# Patient Record
Sex: FEMALE
[De-identification: names, ages, dates, MRNs, and addresses within clinical notes are randomized per-mention and may not be internally consistent; named-entity substitution may affect disease eponyms.]

---

## 2019-07-29 ENCOUNTER — HOSPITAL ENCOUNTER (INPATIENT)
Dept: HOSPITAL 88 - ER | Age: 54
LOS: 2 days | Discharge: HOME | DRG: 445 | End: 2019-07-31
Attending: INTERNAL MEDICINE | Admitting: INTERNAL MEDICINE
Payer: COMMERCIAL

## 2019-07-29 VITALS — SYSTOLIC BLOOD PRESSURE: 116 MMHG | DIASTOLIC BLOOD PRESSURE: 61 MMHG

## 2019-07-29 VITALS — WEIGHT: 215 LBS | HEIGHT: 66 IN | BODY MASS INDEX: 34.55 KG/M2

## 2019-07-29 DIAGNOSIS — Z98.84: ICD-10-CM

## 2019-07-29 DIAGNOSIS — K82.8: Primary | ICD-10-CM

## 2019-07-29 DIAGNOSIS — K56.609: ICD-10-CM

## 2019-07-29 DIAGNOSIS — K80.10: ICD-10-CM

## 2019-07-29 LAB
ALBUMIN SERPL-MCNC: 4 G/DL (ref 3.5–5)
ALBUMIN/GLOB SERPL: 1.3 {RATIO} (ref 0.8–2)
ALP SERPL-CCNC: 94 IU/L (ref 40–150)
ALT SERPL-CCNC: 20 IU/L (ref 0–55)
AMYLASE SERPL-CCNC: 30 U/L (ref 25–125)
ANION GAP SERPL CALC-SCNC: 17.5 MMOL/L (ref 8–16)
BACTERIA URNS QL MICRO: (no result) /HPF
BASOPHILS # BLD AUTO: 0 10*3/UL (ref 0–0.1)
BASOPHILS NFR BLD AUTO: 0.5 % (ref 0–1)
BILIRUB UR QL: NEGATIVE
BUN SERPL-MCNC: 8 MG/DL (ref 7–26)
BUN/CREAT SERPL: 11 (ref 6–25)
CALCIUM SERPL-MCNC: 9.8 MG/DL (ref 8.4–10.2)
CHLORIDE SERPL-SCNC: 104 MMOL/L (ref 98–107)
CK MB SERPL-MCNC: 1.1 NG/ML (ref 0–5)
CK SERPL-CCNC: 99 IU/L (ref 29–168)
CLARITY UR: CLEAR
CO2 SERPL-SCNC: 22 MMOL/L (ref 22–29)
COARSE GRAN CASTS URNS QL MICRO: (no result)
COLOR UR: YELLOW
DEPRECATED NEUTROPHILS # BLD AUTO: 3.4 10*3/UL (ref 2.1–6.9)
DEPRECATED RBC URNS MANUAL-ACNC: (no result) /HPF (ref 0–5)
EGFRCR SERPLBLD CKD-EPI 2021: > 60 ML/MIN (ref 60–?)
EOSINOPHIL # BLD AUTO: 0 10*3/UL (ref 0–0.4)
EOSINOPHIL NFR BLD AUTO: 0.4 % (ref 0–6)
EPI CELLS URNS QL MICRO: (no result) /LPF
ERYTHROCYTE [DISTWIDTH] IN CORD BLOOD: 12.6 % (ref 11.7–14.4)
GLOBULIN PLAS-MCNC: 3 G/DL (ref 2.3–3.5)
GLUCOSE SERPLBLD-MCNC: 137 MG/DL (ref 74–118)
HCT VFR BLD AUTO: 36.8 % (ref 34.2–44.1)
HGB BLD-MCNC: 12.8 G/DL (ref 12–16)
KETONES UR QL STRIP.AUTO: (no result)
LEUKOCYTE ESTERASE UR QL STRIP.AUTO: NEGATIVE
LIPASE SERPL-CCNC: 17 U/L (ref 8–78)
LYMPHOCYTES # BLD: 1.7 10*3/UL (ref 1–3.2)
LYMPHOCYTES NFR BLD AUTO: 30.4 % (ref 18–39.1)
MCH RBC QN AUTO: 31.4 PG (ref 28–32)
MCHC RBC AUTO-ENTMCNC: 34.8 G/DL (ref 31–35)
MCV RBC AUTO: 90.4 FL (ref 81–99)
MONOCYTES # BLD AUTO: 0.4 10*3/UL (ref 0.2–0.8)
MONOCYTES NFR BLD AUTO: 7.5 % (ref 4.4–11.3)
NEUTS SEG NFR BLD AUTO: 60.8 % (ref 38.7–80)
NITRITE UR QL STRIP.AUTO: NEGATIVE
PLATELET # BLD AUTO: 193 X10E3/UL (ref 140–360)
POTASSIUM SERPL-SCNC: 3.5 MMOL/L (ref 3.5–5.1)
PROT UR QL STRIP.AUTO: NEGATIVE
RBC # BLD AUTO: 4.07 X10E6/UL (ref 3.6–5.1)
SODIUM SERPL-SCNC: 140 MMOL/L (ref 136–145)
SP GR UR STRIP: 1.02 (ref 1.01–1.02)
UROBILINOGEN UR STRIP-MCNC: 0.2 MG/DL (ref 0.2–1)
WBC #/AREA URNS HPF: (no result) /HPF (ref 0–5)

## 2019-07-29 PROCEDURE — 82150 ASSAY OF AMYLASE: CPT

## 2019-07-29 PROCEDURE — 99284 EMERGENCY DEPT VISIT MOD MDM: CPT

## 2019-07-29 PROCEDURE — 78227 HEPATOBIL SYST IMAGE W/DRUG: CPT

## 2019-07-29 PROCEDURE — 81001 URINALYSIS AUTO W/SCOPE: CPT

## 2019-07-29 PROCEDURE — 80048 BASIC METABOLIC PNL TOTAL CA: CPT

## 2019-07-29 PROCEDURE — 93005 ELECTROCARDIOGRAM TRACING: CPT

## 2019-07-29 PROCEDURE — 85025 COMPLETE CBC W/AUTO DIFF WBC: CPT

## 2019-07-29 PROCEDURE — 74019 RADEX ABDOMEN 2 VIEWS: CPT

## 2019-07-29 PROCEDURE — 84702 CHORIONIC GONADOTROPIN TEST: CPT

## 2019-07-29 PROCEDURE — 74177 CT ABD & PELVIS W/CONTRAST: CPT

## 2019-07-29 PROCEDURE — 87086 URINE CULTURE/COLONY COUNT: CPT

## 2019-07-29 PROCEDURE — 82553 CREATINE MB FRACTION: CPT

## 2019-07-29 PROCEDURE — 80053 COMPREHEN METABOLIC PANEL: CPT

## 2019-07-29 PROCEDURE — 84484 ASSAY OF TROPONIN QUANT: CPT

## 2019-07-29 PROCEDURE — 82550 ASSAY OF CK (CPK): CPT

## 2019-07-29 PROCEDURE — 71045 X-RAY EXAM CHEST 1 VIEW: CPT

## 2019-07-29 PROCEDURE — 74022 RADEX COMPL AQT ABD SERIES: CPT

## 2019-07-29 PROCEDURE — 83690 ASSAY OF LIPASE: CPT

## 2019-07-29 PROCEDURE — 76705 ECHO EXAM OF ABDOMEN: CPT

## 2019-07-29 PROCEDURE — 36415 COLL VENOUS BLD VENIPUNCTURE: CPT

## 2019-07-29 RX ADMIN — HYDROMORPHONE HYDROCHLORIDE PRN MG: 2 INJECTION INTRAMUSCULAR; INTRAVENOUS; SUBCUTANEOUS at 21:54

## 2019-07-29 RX ADMIN — SODIUM CHLORIDE SCH MLS/HR: 9 INJECTION, SOLUTION INTRAVENOUS at 21:35

## 2019-07-29 RX ADMIN — PHENOBARBITAL ELIXIR SCH ML: 16.2; .1037; .0065; .0194 ELIXIR ORAL at 17:51

## 2019-07-29 NOTE — DIAGNOSTIC IMAGING REPORT
EXAM: Right upper quadrant abdominal ultrasound



INDICATION:   Right upper quadrant pain 

COMPARISON: None. 

TECHNIQUE: Transverse and longitudinal images of the right upper quadrant

abdomen were obtained



FINDINGS:     

Liver:

Size: 12.6 cm in the right midclavicular line, normal

Appearance: Normal echogenicity, smooth contour

Mass: No focal masses



Gallbladder: Multiple echogenic gallstones with posterior shadowing. No

gallbladder wall thickening or pericholecystic fluid. Negative sonographic

Garcia's sign. Gallbladder wall measures 0.3 cm. 



Bile Ducts:

Intrahepatic Ducts: No dilatation

Extrahepatic Ducts: Common bile duct measures 0.2cm, no dilatation



Pancreas:

Limited visualization to to overlying bowel gas.



Kidney: The right kidney measures 10.0 cm without evidence of hydronephrosis or

stone. 



Vessels:

Aorta: Limited visualization due to overlying bowel gas.

Inferior Vena Cava: Limited visualization due to overlying bowel gas.

Main Portal Vein: 0.7 cm, normal size with hepatopetal flow.



Free Fluid:

No ascites or pleural effusion



IMPRESSION:

Cholelithiasis without sonographic evidence of cholecystitis.



Signed by: Brandan Davis MD on 7/29/2019 3:19 PM

## 2019-07-29 NOTE — DIAGNOSTIC IMAGING REPORT
EXAM:  CT of the abdomen and pelvis WITHOUT contrast



HISTORY:  r/o sbo / divertic / pancreatitis c/o ruq pain, history of gastric

bypass

COMPARISON:  None available..



TECHNIQUE: 

The abdomen and pelvis were scanned utilizing a multidetector helical scanner. 



Coronal and sagittal reformats are available.  

            PROTOCOL:  Routine

            IV CONTRAST:  100 cc of Isovue-370.

            ORAL CONTRAST:  None, which limits sensitivity and specificity of

the exam.

            RADIATION DOSE: Total DLP: 841.84 mGy*cm

                      Estimated effective dose:  (DLP x 0.015 x size factor) 

Dose modulation, iterative reconstruction, and/or weight based adjustment of

the mA/kV was utilized to reduce the radiation dose to as low as reasonably

achievable.

            COMPLICATIONS: None



FINDINGS:

LOWER THORAX: 

Incidentally, a subcentimeter thin-walled peripheral cyst at the left lung

base.



HEPATOBILIARY:  

No mass.

No biliary dilation.

No calcified gallstone.

SPLEEN: No splenomegaly.     

PANCREAS: No focal masses or ductal dilatation.     

ADRENALS:  No discrete adrenal nodule.

KIDNEYS/URETERS: 

No hydronephrosis, stones, or definite solid mass lesions.



PELVIC ORGANS/BLADDER: 

Bilateral Essure devices.



GI TRACT: 

Postsurgical changes in the region of the proximal stomach and left upper

quadrant of the abdomen.

Low density fluid within the partially decompressed stomach.

Within the left upper quadrant of the abdomen, dilated and fluid-filled small

bowel (up to 4.4 cm in diameter), short segment narrowing to decompress small

bowel in the region of some of the radiopaque suture material (axial images

32-34).



PERITONEUM / RETROPERITONEUM: Small volume low density free fluid within the

pelvis. No free air.

LYMPH NODES: No pathologically enlarged lymph node.

VESSELS: Unremarkable.

BONES:  No aggressive osseous lesion or acute fracture.

SOFT TISSUES: Otherwise, unremarkable.



IMPRESSION: 

Findings compatible with a high-grade small bowel obstruction in the region of

the postsurgical changes within the left upper quadrant of the abdomen, as

detailed above.



Signed by: Dr. Param Wu D.O., M.M.M. on 7/29/2019 5:49 PM

## 2019-07-29 NOTE — DIAGNOSTIC IMAGING REPORT
EXAMINATION:  CHEST SINGLE (PORTABLE)    



INDICATION: Pain



COMPARISON: None

     

FINDINGS:



LINES/TUBES:EKG leads overlie the chest.



LUNGS:The lungs are well-inflated. No focal consolidation or pulmonary edema.



PLEURA:No pleural effusion or pneumothorax.



MEDIASTINUM:The cardiomediastinal silhouette appears normal in size and shape.



BONES/SOFT TISSUES:No acute osseous injury.



ABDOMEN:No free air under the diaphragm.





IMPRESSION: 

No focal pneumonia or pulmonary edema.



Signed by: Brandan Davis MD on 7/29/2019 4:22 PM

## 2019-07-29 NOTE — XMS REPORT
Patient Summary Document

                             Created on: 2019



PAMELLA BEAR

External Reference #: [id number]

: 1965

Sex: Female



Demographics







                          Address                   [address] [place] [address]

 

                          Home Phone                [phone]

 

                          Preferred Language        Unknown

 

                          Marital Status            Unknown

 

                          Restorationism Affiliation     Unknown

 

                          Race                      Unknown

 

                                        Additional Race(s)  

 

                          Ethnic Group              Unknown





Author







                          Author                    [facility]

 

                          Address                   Unknown

 

                          Phone                     Unavailable







Care Team Providers







                    Care Team Member Name    Role                Phone

 

                    DARRELL ELLIOTT       Unavailable         Unavailable







Problems

This patient has no known problems.



Allergies, Adverse Reactions, Alerts

This patient has no known allergies or adverse reactions.



Medications

This patient has no known medications.



Results







           Test Description    Test Time    Test Comments    Text Results    Atomic Results    Result

 Comments

 

                CT ABDOMEN/PELVIS W    2019 17:34:00                                                       

                                                   [place] [address]      Patient Name: PAMELLA BEAR                                   MR
#: [id number]                     : 1965                               
   Age/Sex: 53/F  Acct #: [id number]                              Req #: [id number]
[id number]  Adm Physician:                                                      
Ordered by: BEATRIZ RIDDLE NP                            Report #: [id number]  
     Location: ER                                      Room/Bed:                
    
___________________________________________________________________________________________________
   Procedure: [id number] CT/CT ABDOMEN/PELVIS W  Exam Date: 19             
              Exam Time: 1615                                              
REPORT STATUS: Signed    EXAM:  CT of the abdomen and pelvis WITHOUT contrast   
  HISTORY:  r/o sbo / divertic / pancreatitis c/o ruq pain, history of gastric  
bypass   COMPARISON:  None available..      TECHNIQUE:    The abdomen and pelvis
were scanned utilizing a multidetector helical scanner.       Coronal and 
sagittal reformats are available.                 PROTOCOL:  Routine            
  IV CONTRAST:  100 cc of Isovue-370.               ORAL CONTRAST:  None, which 
limits sensitivity and specificity of   the exam.               RADIATION DOSE: 
Total DLP: 841.84 mGy*cm                         Estimated effective dose:  (DLP
x 0.015 x size factor)    Dose modulation, iterative reconstruction, and/or 
weight based adjustment of   the mA/kV was utilized to reduce the radiation dose
to as low as reasonably   achievable.               COMPLICATIONS: None      
FINDINGS:   LOWER THORAX:    Incidentally, a subcentimeter thin-walled 
peripheral cyst at the left lung   base.      HEPATOBILIARY:     No mass.   No 
biliary dilation.   No calcified gallstone.   SPLEEN: No splenomegaly.        
PANCREAS: No focal masses or ductal dilatation.        ADRENALS:  No discrete 
adrenal nodule.   KIDNEYS/URETERS:    No hydronephrosis, stones, or definite 
solid mass lesions.      PELVIC ORGANS/BLADDER:    Bilateral Essure devices.    
 GI TRACT:    Postsurgical changes in the region of the proximal stomach and 
left upper   quadrant of the abdomen.   Low density fluid within the partially d
ecompressed stomach.   Within the left upper quadrant of the abdomen, dilated 
and fluid-filled small   bowel (up to 4.4 cm in diameter), short segment 
narrowing to decompress small   bowel in the region of some of the radiopaque 
suture material (axial images   32-34).      PERITONEUM / RETROPERITONEUM: Small
volume low density free fluid within the   pelvis. No free air.   LYMPH NODES: 
No pathologically enlarged lymph node.   VESSELS: Unremarkable.   BONES:  No 
aggressive osseous lesion or acute fracture.   SOFT TISSUES: Otherwise, 
unremarkable.      IMPRESSION:    Findings compatible with a high-grade small 
bowel obstruction in the region of   the postsurgical changes within the left 
upper quadrant of the abdomen, as   detailed above.      Signed by: Dr. Elma Wu D.O., M.M.M. on 2019 5:49 PM        Dictated By: ELMA WU DO  
Electronically Signed By: ELMA WU DO on 19  Transcribed By: 
SILVANA on 19       COPY TO:   BEATRIZ RIDDLE NP              

 

                CHEST SINGLE (PORTABLE)    2019 16:19:00                                                   

                                                       [place] [address]      Patient Name: PAMELLA BEAR                           
       MR #: [id number]                     : 1965                     
             Age/Sex: 53/F  Acct #: [id number]                              
Req #: [id number]  Adm Physician:                                               
      Ordered by: SHORT, BEATRIZ D NP                            Report #: [id number]
[id number]        Location: ER                                      Room/Bed:         
           
___________________________________________________________________________________________________
   Procedure: [id number] DX/CHEST SINGLE (PORTABLE)  Exam Date: 19         
                  Exam Time: 1601                                              
REPORT STATUS: Signed    EXAMINATION:  CHEST SINGLE (PORTABLE)          INDICA
TION: Pain      COMPARISON: None           FINDINGS:      LINES/TUBES:EKG leads 
overlie the chest.      LUNGS:The lungs are well-inflated. No focal 
consolidation or pulmonary edema.      PLEURA:No pleural effusion or 
pneumothorax.      MEDIASTINUM:The cardiomediastinal silhouette appears normal 
in size and shape.      BONES/SOFT TISSUES:No acute osseous injury.      
ABDOMEN:No free air under the diaphragm.         IMPRESSION:    No focal 
pneumonia or pulmonary edema.      Signed by: Richard Parekh MD on 2019 4:22 
PM        Dictated By: RICHARD PAREKH MD  Electronically Signed By: RICHARD PAREKH MD on
19  Transcribed By: SILVANA on 19       COPY TO:   
BEATRIZ RIDDLE NP                       

 

                US GALLBLADDER    2019 15:17:00                                                            

                                              [place] [address] 
    Patient Name: PAMELLA BEAR                                   MR #: 
[id number]                     : 1965                                  
Age/Sex: 53/F  Acct #: [id number]                              Req #: [id number]
[id number]  Adm Physician:                                                      
Ordered by: BEATRIZ RIDDLE NP                            Report #: [id number]  
     Location: ER                                      Room/Bed:                
    
___________________________________________________________________________________________________
   Procedure: [id number] US/US GALLBLADDER  Exam Date: 19                  
         Exam Time: 1445                                              REPORT 
STATUS: Signed    EXAM: Right upper quadrant abdominal ultrasound      INDICATIO
N:   Right upper quadrant pain    COMPARISON: None.    TECHNIQUE: Transverse and
longitudinal images of the right upper quadrant   abdomen were obtained      
FINDINGS:        Liver:   Size: 12.6 cm in the right midclavicular line, normal 
 Appearance: Normal echogenicity, smooth contour   Mass: No focal masses      
Gallbladder: Multiple echogenic gallstones with posterior shadowing. No   
gallbladder wall thickening or pericholecystic fluid. Negative sonographic   
Garcia's sign. Gallbladder wall measures 0.3 cm.       Bile Ducts:   
Intrahepatic Ducts: No dilatation   Extrahepatic Ducts: Common bile duct 
measures 0.2cm, no dilatation      Pancreas:   Limited visualization to to 
overlying bowel gas.      Kidney: The right kidney measures 10.0 cm without 
evidence of hydronephrosis or   stone.       Vessels:   Aorta: Limited 
visualization due to overlying bowel gas.   Inferior Vena Cava: Limited 
visualization due to overlying bowel gas.   Main Portal Vein: 0.7 cm, normal 
size with hepatopetal flow.      Free Fluid:   No ascites or pleural effusion   
  IMPRESSION:   Cholelithiasis without sonographic evidence of cholecystitis.   
  Signed by: Richard Parekh MD on 2019 3:19 PM        Dictated By: RICHARD PAREKH MD  Electronically Signed By: RICHARD PAREKH MD on 19 5700  Transcribed By: 
SILVANA on 19 1687       COPY TO:   BEATRIZ RIDDLE NP

## 2019-07-30 VITALS — DIASTOLIC BLOOD PRESSURE: 62 MMHG | SYSTOLIC BLOOD PRESSURE: 118 MMHG

## 2019-07-30 VITALS — DIASTOLIC BLOOD PRESSURE: 76 MMHG | SYSTOLIC BLOOD PRESSURE: 127 MMHG

## 2019-07-30 VITALS — DIASTOLIC BLOOD PRESSURE: 61 MMHG | SYSTOLIC BLOOD PRESSURE: 118 MMHG

## 2019-07-30 VITALS — SYSTOLIC BLOOD PRESSURE: 119 MMHG | DIASTOLIC BLOOD PRESSURE: 69 MMHG

## 2019-07-30 VITALS — SYSTOLIC BLOOD PRESSURE: 118 MMHG | DIASTOLIC BLOOD PRESSURE: 62 MMHG

## 2019-07-30 VITALS — SYSTOLIC BLOOD PRESSURE: 120 MMHG | DIASTOLIC BLOOD PRESSURE: 67 MMHG

## 2019-07-30 LAB
ANION GAP SERPL CALC-SCNC: 11.5 MMOL/L (ref 8–16)
BASOPHILS # BLD AUTO: 0 10*3/UL (ref 0–0.1)
BASOPHILS NFR BLD AUTO: 0.6 % (ref 0–1)
BUN SERPL-MCNC: 9 MG/DL (ref 7–26)
BUN/CREAT SERPL: 13 (ref 6–25)
CALCIUM SERPL-MCNC: 8.8 MG/DL (ref 8.4–10.2)
CHLORIDE SERPL-SCNC: 108 MMOL/L (ref 98–107)
CO2 SERPL-SCNC: 25 MMOL/L (ref 22–29)
DEPRECATED NEUTROPHILS # BLD AUTO: 3.4 10*3/UL (ref 2.1–6.9)
EGFRCR SERPLBLD CKD-EPI 2021: > 60 ML/MIN (ref 60–?)
EOSINOPHIL # BLD AUTO: 0 10*3/UL (ref 0–0.4)
EOSINOPHIL NFR BLD AUTO: 0.2 % (ref 0–6)
ERYTHROCYTE [DISTWIDTH] IN CORD BLOOD: 12.7 % (ref 11.7–14.4)
GLUCOSE SERPLBLD-MCNC: 120 MG/DL (ref 74–118)
HCT VFR BLD AUTO: 34 % (ref 34.2–44.1)
HGB BLD-MCNC: 11.6 G/DL (ref 12–16)
LYMPHOCYTES # BLD: 1.5 10*3/UL (ref 1–3.2)
LYMPHOCYTES NFR BLD AUTO: 27.1 % (ref 18–39.1)
MCH RBC QN AUTO: 31.6 PG (ref 28–32)
MCHC RBC AUTO-ENTMCNC: 34.1 G/DL (ref 31–35)
MCV RBC AUTO: 92.6 FL (ref 81–99)
MONOCYTES # BLD AUTO: 0.5 10*3/UL (ref 0.2–0.8)
MONOCYTES NFR BLD AUTO: 9.2 % (ref 4.4–11.3)
NEUTS SEG NFR BLD AUTO: 62.5 % (ref 38.7–80)
PLATELET # BLD AUTO: 163 X10E3/UL (ref 140–360)
POTASSIUM SERPL-SCNC: 3.5 MMOL/L (ref 3.5–5.1)
RBC # BLD AUTO: 3.67 X10E6/UL (ref 3.6–5.1)
SODIUM SERPL-SCNC: 141 MMOL/L (ref 136–145)

## 2019-07-30 RX ADMIN — HYDROMORPHONE HYDROCHLORIDE PRN MG: 2 INJECTION INTRAMUSCULAR; INTRAVENOUS; SUBCUTANEOUS at 04:24

## 2019-07-30 RX ADMIN — PHENOBARBITAL ELIXIR SCH ML: 16.2; .1037; .0065; .0194 ELIXIR ORAL at 15:00

## 2019-07-30 RX ADMIN — FAMOTIDINE SCH MG: 10 INJECTION, SOLUTION INTRAVENOUS at 09:06

## 2019-07-30 RX ADMIN — PROMETHAZINE HYDROCHLORIDE PRN MG: 25 INJECTION, SOLUTION INTRAMUSCULAR; INTRAVENOUS at 04:24

## 2019-07-30 RX ADMIN — PROMETHAZINE HYDROCHLORIDE PRN MG: 25 INJECTION, SOLUTION INTRAMUSCULAR; INTRAVENOUS at 18:27

## 2019-07-30 RX ADMIN — PROMETHAZINE HYDROCHLORIDE PRN MG: 25 INJECTION, SOLUTION INTRAMUSCULAR; INTRAVENOUS at 09:06

## 2019-07-30 RX ADMIN — HYDROMORPHONE HYDROCHLORIDE PRN MG: 2 INJECTION INTRAMUSCULAR; INTRAVENOUS; SUBCUTANEOUS at 09:06

## 2019-07-30 RX ADMIN — PHENOBARBITAL ELIXIR SCH ML: 16.2; .1037; .0065; .0194 ELIXIR ORAL at 10:04

## 2019-07-30 RX ADMIN — SODIUM CHLORIDE SCH MLS/HR: 9 INJECTION, SOLUTION INTRAVENOUS at 14:00

## 2019-07-30 RX ADMIN — FAMOTIDINE SCH MG: 10 INJECTION, SOLUTION INTRAVENOUS at 18:27

## 2019-07-30 RX ADMIN — SODIUM CHLORIDE SCH MLS/HR: 9 INJECTION, SOLUTION INTRAVENOUS at 04:30

## 2019-07-30 RX ADMIN — PHENOBARBITAL ELIXIR SCH ML: 16.2; .1037; .0065; .0194 ELIXIR ORAL at 21:00

## 2019-07-30 NOTE — CONSULTATION
DATE OF CONSULTATION:  07/30/2019  

 

HISTORY OF PRESENT ILLNESS:  The patient is a 53-year-old female, previous gastric

bypass done in 2008, presents with complaints of epigastric abdominal pain started about

four days ago, was milder, then got worse and became very severe yesterday, for which

she came to the emergency room.  She had associated nausea and vomiting.  She says the

pain is less now.  Evaluation with CT scan of the abdomen and pelvis as well as

gallbladder ultrasound revealed findings suggestive of possible intestinal obstruction.

All this was strictly involving the Calos-Y limb and also she was found to have

gallstones. 

 

PAST MEDICAL HISTORY:  Significant for previous gastric bypass as stated above, previous

jaw surgery.  She denies any other medical problems. 

 

ALLERGIES:  SHE HAS NO KNOWN ALLERGIES.

 

MEDICATIONS:  There were no current medications except for vitamins.

 

FAMILY HISTORY:  Noncontributory.

 

SOCIAL HISTORY:  The patient does not smoke cigarettes or drink alcohol.

 

REVIEW OF SYSTEMS:

As stated above, otherwise was negative.

 

PHYSICAL EXAMINATION:

GENERAL:  The patient is awake, alert, in no distress. 

VITAL SIGNS:  Normal. 

HEENT:  Unremarkable.  Sclerae are not icteric. 

NECK:  Supple.  No masses. 

LUNGS:  Equal breath sounds are clear bilaterally. 

CARDIAC:  Regular rate and rhythm with no murmur. 

ABDOMEN:  Soft.  There was no significant tenderness.  No mass.  No distention.  No

organomegaly. 

EXTREMITIES:  Have no edema.  Pulses are palpable. 

NEUROLOGIC:  Intact.

ASSESSMENT:  A 53-year-old female with abdominal pain.  CT scan suggests small bowel

obstruction, but she also has gallstones.  Her symptoms are more consistent with

possible gallbladder disease and biliary colic rather than intestinal obstruction as she

is passing flatus and findings on CT scan have dilated Calos Y limb is physiologic after

gastric bypass. 

 

PLAN:  To evaluate further with a HIDA scan.  There are no signs of peritonitis.  No

findings that warrant immediate surgical intervention. 

 

Thank you for asking me to see Ms. Nails.

 

 

 

 

______________________________

MD ALEX Blackwood/GRACIELA

D:  07/30/2019 08:13:13

T:  07/30/2019 12:42:04

Job #:  [id number]

## 2019-07-30 NOTE — NUR
PATIENT IS MOSTLY ASLEEP, SHE'S EASY TO AROUSE AND SHE DENIES ABDOMINAL PAIN. BED ALARM ON, 
CALL LIGHT WITHIN EASY REACH. CALL AND SPOKE WITH DR MORRISON REGARDING THE PRELIMINARY HIDA 
SCAN RESULT WITH EF OF 7%, MD STATED "I WILL SEE HER TOMORROW."

## 2019-07-30 NOTE — DIAGNOSTIC IMAGING REPORT
Exam: KUB - 2 views



Clinical History: Small bowel obstruction



Comparison: CT abdomen pelvis of 7/29/2019



Findings: 

There are distended loops of small bowel in the left abdomen measuring up to at

least 3.5 cm with air-fluid levels consistent with known small bowel

obstruction seen on the CT abdomen and pelvis of 7/29/2019. No free air.

Surgical clips and suture project over the left upper quadrant. The partially

visualized lung bases appear unremarkable. The osseous structures appear

unremarkable. There is contrast material in the bladder. Incidental note of

tubal ligation devices. No abnormal calcifications.



Impression:

Redemonstration of findings of small bowel obstruction. No free air.



Signed by: Brandan Davis MD on 7/30/2019 9:56 AM

## 2019-07-30 NOTE — DIAGNOSTIC IMAGING REPORT
Hepatobiliary Scan with Gallbladder Ejection Fraction



Clinical information: 53 F with small bowel obstruction.  Severe abdominal pain





Technique: Following intravenous administration of 6.8 millicuries of Tc-99m

mebrofenin, dynamic images of the abdomen in the anterior projection were

obtained through 30 minutes.  Additional static image was obtained at 1 hour. 

Sincalide (CCK analog) 2.0 micrograms was administered intravenously over 30

minutes with additional imaging for determination of gallbladder ejection

fraction.



Discussion: Perfusion of the liver is normal.  Extraction of tracer by the

liver parenchyma is normal.  Tracer appears promptly within the biliary tract. 

The gallbladder begins to fill  by 60 minutes post injection of tracer and

fills adequately.  Tracer is seen in the small bowel by 21 minutes.  The

gallbladder ejection fraction with sincalide is 7% (normal greater than 40%).



Impression: 



1.  Filling of the gallbladder excludes acute cystic duct obstruction/acute

cholecystitis.



2.  The decreased gallbladder ejection fraction of 7% supports the clinical

diagnosis of chronic cholecystitis/gallbladder dyskinesia.



Signed by: Dr. Rosana Christie M.D. on 7/30/2019 7:51 PM

## 2019-07-30 NOTE — NUR
Report given to oncoming nurse of patient's status. Resting in bed. No s/s of acute distress 
noted.

## 2019-07-30 NOTE — NUR
The patient was feeling nausea. RN provided the ememis bag while administering Phenergan and 
pain medication. The patient stated she is feeling better.

## 2019-07-31 VITALS — SYSTOLIC BLOOD PRESSURE: 121 MMHG | DIASTOLIC BLOOD PRESSURE: 72 MMHG

## 2019-07-31 VITALS — SYSTOLIC BLOOD PRESSURE: 107 MMHG | DIASTOLIC BLOOD PRESSURE: 66 MMHG

## 2019-07-31 VITALS — DIASTOLIC BLOOD PRESSURE: 72 MMHG | SYSTOLIC BLOOD PRESSURE: 121 MMHG

## 2019-07-31 VITALS — SYSTOLIC BLOOD PRESSURE: 110 MMHG | DIASTOLIC BLOOD PRESSURE: 64 MMHG

## 2019-07-31 VITALS — SYSTOLIC BLOOD PRESSURE: 163 MMHG | DIASTOLIC BLOOD PRESSURE: 59 MMHG

## 2019-07-31 VITALS — SYSTOLIC BLOOD PRESSURE: 119 MMHG | DIASTOLIC BLOOD PRESSURE: 67 MMHG

## 2019-07-31 LAB
ANION GAP SERPL CALC-SCNC: 12.8 MMOL/L (ref 8–16)
BASOPHILS # BLD AUTO: 0 10*3/UL (ref 0–0.1)
BASOPHILS NFR BLD AUTO: 0.7 % (ref 0–1)
BUN SERPL-MCNC: 6 MG/DL (ref 7–26)
BUN/CREAT SERPL: 9 (ref 6–25)
CALCIUM SERPL-MCNC: 8.9 MG/DL (ref 8.4–10.2)
CHLORIDE SERPL-SCNC: 104 MMOL/L (ref 98–107)
CO2 SERPL-SCNC: 26 MMOL/L (ref 22–29)
DEPRECATED NEUTROPHILS # BLD AUTO: 2.1 10*3/UL (ref 2.1–6.9)
EGFRCR SERPLBLD CKD-EPI 2021: > 60 ML/MIN (ref 60–?)
EOSINOPHIL # BLD AUTO: 0.1 10*3/UL (ref 0–0.4)
EOSINOPHIL NFR BLD AUTO: 2.2 % (ref 0–6)
ERYTHROCYTE [DISTWIDTH] IN CORD BLOOD: 12.5 % (ref 11.7–14.4)
GLUCOSE SERPLBLD-MCNC: 96 MG/DL (ref 74–118)
HCT VFR BLD AUTO: 34.7 % (ref 34.2–44.1)
HGB BLD-MCNC: 11.9 G/DL (ref 12–16)
LYMPHOCYTES # BLD: 1.9 10*3/UL (ref 1–3.2)
LYMPHOCYTES NFR BLD AUTO: 41.2 % (ref 18–39.1)
MCH RBC QN AUTO: 31.4 PG (ref 28–32)
MCHC RBC AUTO-ENTMCNC: 34.3 G/DL (ref 31–35)
MCV RBC AUTO: 91.6 FL (ref 81–99)
MONOCYTES # BLD AUTO: 0.4 10*3/UL (ref 0.2–0.8)
MONOCYTES NFR BLD AUTO: 9 % (ref 4.4–11.3)
NEUTS SEG NFR BLD AUTO: 46.7 % (ref 38.7–80)
PLATELET # BLD AUTO: 149 X10E3/UL (ref 140–360)
POTASSIUM SERPL-SCNC: 3.8 MMOL/L (ref 3.5–5.1)
RBC # BLD AUTO: 3.79 X10E6/UL (ref 3.6–5.1)
SODIUM SERPL-SCNC: 139 MMOL/L (ref 136–145)

## 2019-07-31 RX ADMIN — SODIUM CHLORIDE SCH MLS/HR: 9 INJECTION, SOLUTION INTRAVENOUS at 08:14

## 2019-07-31 RX ADMIN — PHENOBARBITAL ELIXIR SCH ML: 16.2; .1037; .0065; .0194 ELIXIR ORAL at 08:33

## 2019-07-31 RX ADMIN — PHENOBARBITAL ELIXIR SCH ML: 16.2; .1037; .0065; .0194 ELIXIR ORAL at 17:26

## 2019-07-31 RX ADMIN — SODIUM CHLORIDE SCH MLS/HR: 9 INJECTION, SOLUTION INTRAVENOUS at 00:15

## 2019-07-31 RX ADMIN — SODIUM CHLORIDE SCH MLS/HR: 9 INJECTION, SOLUTION INTRAVENOUS at 11:00

## 2019-07-31 RX ADMIN — FAMOTIDINE SCH MG: 10 INJECTION, SOLUTION INTRAVENOUS at 17:26

## 2019-07-31 RX ADMIN — FAMOTIDINE SCH MG: 10 INJECTION, SOLUTION INTRAVENOUS at 08:33

## 2019-07-31 NOTE — NUR
PER DISCUSSION DURING ROUNDS PATIENT TO GET TESTING AND RESULTS CALLED TO DR. VILCHIS. IF 
RESULTS WNL AND PATIENT CLEARED BY DR. MORRISON THEN PATIENT TO DC TODAY.

## 2019-07-31 NOTE — DIAGNOSTIC IMAGING REPORT
EXAMINATION:  ABDOMEN ACUTE SERIES W/PA CXR    



INDICATION: Pain, known small bowel obstruction.



COMPARISON: KUB of 7/30/2019, CT abdomen pelvis of 7/29/2019

     

FINDINGS:



LINES/TUBES:None



LUNGS:The lungs are well-inflated. No focal consolidation or pulmonary edema.



PLEURA:No pleural effusion or pneumothorax.



MEDIASTINUM:The cardiomediastinal silhouette appears normal in size and shape.



BONES/SOFT TISSUES:No acute osseous injury.



ABDOMEN:Multiple nondistended air-filled loops of bowel in the left upper

quadrant. Interval improvement in dilated loops compared to 7/30/2019. No free

air. Surgical clips and sutures overlie the left upper quadrant. Tubal ligation

devices are present. Phleboliths in the pelvis. The osseous structures appear

unremarkable.





IMPRESSION: 

Interval improvement in dilated left upper quadrant bowel loops compared to

7/29/2019 and 7/30/2019. No free air.



No focal pneumonia or pulmonary edema.



Signed by: Brandan Davis MD on 7/31/2019 3:26 PM

## 2019-07-31 NOTE — NUR
WALKING ROUNDS MADE, PATIENT ASLEEP BUT SHE'S EASY TO AROUSE. SHE DENIES ABDOMINAL PAIN OR 
NAUSEA, BED ALARM ON, CALL LIGHT WITHIN EASY REACH.

## 2019-07-31 NOTE — NUR
PATIENT CONDITION STABLE WITHOUT ABDOMINAL PAIN OR NAUSEA. DR MORRISON SAW THE PATIENT, PLAN 
IS FOR CLEAR LIQUID DIET TODAY AND SURGERY FOR TOMORROW.

## 2019-07-31 NOTE — NUR
PATIENT IS ASLEEP, SHE'S EASY TO AROUSE. NO RESPIRATORY DISTRESS OBSERVED, SHE DENIES 
ABDOMINAL PAIN OR NAUSEA. CALL LIGHT WITHIN EASY REACH, SHE'S INSTRUCTED TO CALL FOR 
ASSISTANCE AS NEEDED.

## 2019-08-05 LAB
BASOPHILS # BLD AUTO: 0 10*3/UL (ref 0–0.1)
BASOPHILS NFR BLD AUTO: 0.5 % (ref 0–1)
DEPRECATED NEUTROPHILS # BLD AUTO: 1.8 10*3/UL (ref 2.1–6.9)
EOSINOPHIL # BLD AUTO: 0.1 10*3/UL (ref 0–0.4)
EOSINOPHIL NFR BLD AUTO: 2.3 % (ref 0–6)
ERYTHROCYTE [DISTWIDTH] IN CORD BLOOD: 12.7 % (ref 11.7–14.4)
HCT VFR BLD AUTO: 35.9 % (ref 34.2–44.1)
HGB BLD-MCNC: 12 G/DL (ref 12–16)
LYMPHOCYTES # BLD: 1.7 10*3/UL (ref 1–3.2)
LYMPHOCYTES NFR BLD AUTO: 44 % (ref 18–39.1)
MCH RBC QN AUTO: 30.9 PG (ref 28–32)
MCHC RBC AUTO-ENTMCNC: 33.4 G/DL (ref 31–35)
MCV RBC AUTO: 92.5 FL (ref 81–99)
MONOCYTES # BLD AUTO: 0.3 10*3/UL (ref 0.2–0.8)
MONOCYTES NFR BLD AUTO: 7.2 % (ref 4.4–11.3)
NEUTS SEG NFR BLD AUTO: 45.7 % (ref 38.7–80)
PLATELET # BLD AUTO: 186 X10E3/UL (ref 140–360)
RBC # BLD AUTO: 3.88 X10E6/UL (ref 3.6–5.1)

## 2019-08-08 ENCOUNTER — HOSPITAL ENCOUNTER (OUTPATIENT)
Dept: HOSPITAL 88 - OR | Age: 54
Discharge: HOME | End: 2019-08-08
Attending: SURGERY
Payer: COMMERCIAL

## 2019-08-08 VITALS — SYSTOLIC BLOOD PRESSURE: 121 MMHG | DIASTOLIC BLOOD PRESSURE: 72 MMHG

## 2019-08-08 DIAGNOSIS — Z01.812: ICD-10-CM

## 2019-08-08 DIAGNOSIS — K82.8: ICD-10-CM

## 2019-08-08 DIAGNOSIS — K80.10: Primary | ICD-10-CM

## 2019-08-08 DIAGNOSIS — Z98.84: ICD-10-CM

## 2019-08-08 PROCEDURE — 88304 TISSUE EXAM BY PATHOLOGIST: CPT

## 2019-08-08 PROCEDURE — 36415 COLL VENOUS BLD VENIPUNCTURE: CPT

## 2019-08-08 PROCEDURE — 93005 ELECTROCARDIOGRAM TRACING: CPT

## 2019-08-08 PROCEDURE — 85025 COMPLETE CBC W/AUTO DIFF WBC: CPT

## 2019-08-08 PROCEDURE — 47562 LAPAROSCOPIC CHOLECYSTECTOMY: CPT

## 2019-08-08 NOTE — OPERATIVE REPORT
DATE OF PROCEDURE:  08/08/2019

 

SURGEON:  Manoj Woodward MD

 

PREOPERATIVE DIAGNOSES:  Chronic cholecystitis and cholelithiasis.

 

POSTOPERATIVE DIAGNOSES:  Chronic cholecystitis and cholelithiasis.

 

PROCEDURE:  Diagnostic laparoscopy and laparoscopic cholecystectomy.

 

ASSISTANT:  None.

 

ANESTHESIA:  General endotracheal.

 

INDICATIONS AND FINDINGS:  The patient is a 53-year-old female with an episode of severe

epigastric right upper quadrant abdominal pain.  Workup revealed gallstones.  Surgery

based on gallbladder was distended containing multiple stones.  Cystic duct was about 3

mm in diameter.  Common bile duct was about 6 mm in diameter.  Liver, stomach, lower

abdomen all appeared normal. 

 

TECHNIQUE:  After adequate general anesthesia, the patient in supine position, the

abdomen was prepped and draped in sterile fashion with ChloraPrep solution.  Skin in the

umbilicus was infiltrated with 0.5% Marcaine, incision made in the umbilicus, abdominal

wall was elevated and Veress needle was introduced.  Pneumoperitoneum was then created.

A 10 mm trocar and cannula were then passed through the umbilical wound.  Laparoscopic

camera was introduced.  Initial laparoscopy revealed liver, stomach and lower abdomen

all appeared normal.  There were few adhesions involving the omentum.  A 10 mm trocar

and cannula were placed in the epigastrium, two 5 mm trocars and cannulas were placed in

the right upper quadrant.  These were placed under direct vision.  Fundus of the

gallbladder was grasped, retracted superiorly.  The adhesions that were present

involving the omentum, these were lysed.  Neck of the gallbladder was grasped, retracted

laterally.  Peritoneum over the neck of the gallbladder was incised.  The gallbladder

cystic duct junction was dissected free.  Cystic artery was also dissected free.  Cystic

artery was divided between hemoclips close to the gallbladder.  Cystic duct was also

divided between hemoclips with three clips being left on the common bile duct side.  So,

the posterior branch of cystic artery was also divided between hemoclips.  The

gallbladder was dissected free from the liver using scissors and electrocautery.  Once

it was entirely free, it was placed into an Endopouch and brought through the epigastric

cannula containing multiple stones.  Gallbladder bed was inspected for hemostasis which

was seen to be adequate.  It was irrigated with saline.  All fluid aspirated, inspected

once again for hemostasis which was seen to be adequate.  Instruments and cannulas were

then removed.  Pneumoperitoneum was evacuated.  Wounds were then closed.  Fascia in the

umbilical and epigastric wound closed with 0 Vicryl, skin to all wounds closed with 4-0

Vicryl in subcuticular fashion.  Sterile dressings applied to each wound.  The patient

tolerated the procedure well.  Estimated blood loss was 10 mL.  There were no 

complications.  All counts were correct.  The patient was taken to the recovery room in

satisfactory condition. 

 

 

 

 

______________________________

MD ALEX Blackwood/GRACIELA

D:  08/08/2019 15:14:21

T:  08/08/2019 22:28:02

Job #:  [id number]